# Patient Record
Sex: FEMALE | Race: WHITE | URBAN - METROPOLITAN AREA
[De-identification: names, ages, dates, MRNs, and addresses within clinical notes are randomized per-mention and may not be internally consistent; named-entity substitution may affect disease eponyms.]

---

## 2019-06-18 ENCOUNTER — Encounter (OUTPATIENT)
Dept: URBAN - METROPOLITAN AREA CLINIC 71 | Facility: CLINIC | Age: 69
End: 2019-06-18
Payer: MEDICARE

## 2019-06-18 PROCEDURE — 99204 OFFICE O/P NEW MOD 45 MIN: CPT | Performed by: OPHTHALMOLOGY

## 2019-07-30 ENCOUNTER — Encounter (OUTPATIENT)
Dept: URBAN - METROPOLITAN AREA CLINIC 71 | Facility: CLINIC | Age: 69
End: 2019-07-30
Payer: MEDICARE

## 2019-07-30 PROCEDURE — 92012 INTRM OPH EXAM EST PATIENT: CPT | Performed by: OPHTHALMOLOGY

## 2019-08-13 ENCOUNTER — SURGERY (OUTPATIENT)
Dept: URBAN - METROPOLITAN AREA SURGERY 44 | Facility: SURGERY | Age: 69
End: 2019-08-13
Payer: MEDICARE

## 2019-08-13 PROCEDURE — 66984 XCAPSL CTRC RMVL W/O ECP: CPT | Performed by: OPHTHALMOLOGY

## 2019-08-14 ENCOUNTER — Encounter (OUTPATIENT)
Dept: URBAN - METROPOLITAN AREA CLINIC 71 | Facility: CLINIC | Age: 69
End: 2019-08-14

## 2019-08-14 PROCEDURE — 99024 POSTOP FOLLOW-UP VISIT: CPT | Performed by: OPTOMETRIST

## 2019-08-27 ENCOUNTER — Encounter (OUTPATIENT)
Dept: URBAN - METROPOLITAN AREA CLINIC 71 | Facility: CLINIC | Age: 69
End: 2019-08-27

## 2019-08-27 PROCEDURE — 99024 POSTOP FOLLOW-UP VISIT: CPT | Performed by: OPHTHALMOLOGY

## 2019-09-17 ENCOUNTER — Encounter (OUTPATIENT)
Dept: URBAN - METROPOLITAN AREA CLINIC 71 | Facility: CLINIC | Age: 69
End: 2019-09-17

## 2019-09-17 PROCEDURE — 99024 POSTOP FOLLOW-UP VISIT: CPT | Performed by: OPHTHALMOLOGY

## 2019-10-17 ENCOUNTER — Encounter (OUTPATIENT)
Dept: URBAN - METROPOLITAN AREA CLINIC 71 | Facility: CLINIC | Age: 69
End: 2019-10-17

## 2019-10-17 PROCEDURE — 99024 POSTOP FOLLOW-UP VISIT: CPT | Performed by: OPHTHALMOLOGY

## 2020-04-21 ENCOUNTER — OFFICE VISIT (OUTPATIENT)
Dept: URBAN - METROPOLITAN AREA CLINIC 71 | Facility: CLINIC | Age: 70
End: 2020-04-21
Payer: MEDICARE

## 2020-04-21 DIAGNOSIS — H52.223 REGULAR ASTIGMATISM, BILATERAL: ICD-10-CM

## 2020-04-21 DIAGNOSIS — Z98.42 CATARACT EXTRACTION STATUS, LEFT EYE: Primary | ICD-10-CM

## 2020-04-21 PROCEDURE — 99212 OFFICE O/P EST SF 10 MIN: CPT | Performed by: OPHTHALMOLOGY

## 2020-04-21 PROCEDURE — 92134 CPTRZ OPH DX IMG PST SGM RTA: CPT | Performed by: OPHTHALMOLOGY

## 2020-04-21 RX ORDER — LORAZEPAM 0.5 MG/1
0.5 MG TABLET ORAL
Qty: 0 | Refills: 0 | Status: INACTIVE
Start: 2020-04-21 | End: 2020-07-21

## 2020-04-21 ASSESSMENT — INTRAOCULAR PRESSURE
OD: 16
OS: 18

## 2020-04-21 NOTE — IMPRESSION/PLAN
Impression: Cataract extraction status, left eye: Z98.42. Plan: pt is still seeing retina due to the thickening of the retina. 

will r/c 3 mons to see where she is at and how the retina is doing with an OCT

## 2020-07-21 ENCOUNTER — OFFICE VISIT (OUTPATIENT)
Dept: URBAN - METROPOLITAN AREA CLINIC 71 | Facility: CLINIC | Age: 70
End: 2020-07-21
Payer: MEDICARE

## 2020-07-21 DIAGNOSIS — H59.032 CYSTOID MACULAR EDEMA FOLLOWING CATARACT SURGERY, LEFT EYE: Primary | ICD-10-CM

## 2020-07-21 PROCEDURE — 92014 COMPRE OPH EXAM EST PT 1/>: CPT | Performed by: OPHTHALMOLOGY

## 2020-07-21 PROCEDURE — 92134 CPTRZ OPH DX IMG PST SGM RTA: CPT | Performed by: OPHTHALMOLOGY

## 2020-07-21 ASSESSMENT — KERATOMETRY
OD: 43.88
OS: 43.88

## 2020-07-21 ASSESSMENT — INTRAOCULAR PRESSURE
OS: 16
OS: 17
OD: 17

## 2020-07-21 NOTE — IMPRESSION/PLAN
Impression: Cystoid macular edema following cataract surgery, left eye: H59.032. Stable. Plan: OCT ordered today. Patient to continue appointments with retinal specialist.
Kanika Alexander to continue use of brimonidine.  
Will have patient return in 4 months

## 2020-11-17 ENCOUNTER — OFFICE VISIT (OUTPATIENT)
Dept: URBAN - METROPOLITAN AREA CLINIC 71 | Facility: CLINIC | Age: 70
End: 2020-11-17
Payer: MEDICARE

## 2020-11-17 PROCEDURE — 99213 OFFICE O/P EST LOW 20 MIN: CPT | Performed by: OPHTHALMOLOGY

## 2020-11-17 ASSESSMENT — INTRAOCULAR PRESSURE
OD: 20
OS: 31
OD: 17
OS: 32

## 2020-11-17 NOTE — IMPRESSION/PLAN
Impression: Cystoid macular edema following cataract surgery, left eye: H59.032.  Plan: Follow up with retina specialist as planned

## 2020-11-17 NOTE — IMPRESSION/PLAN
Impression: Ocular hypertension, bilateral: H40.053. Plan: Ocular Hypertension is elevated intraocular pressure that may cause glaucoma and require treatment. Not every patient with ocular hypertension needs treatment. Ocular Hypertension may need to be treated with glaucoma eye drops. Contact Office if: Ocular hypertension is associated with eye pain, blurry vision, halos around lights, and loss of peripheral vision. Recent studies have shown that patients with thin corneas, and a family history of glaucoma are more likely to develop glaucoma. 
Continue Brimonidine OS TID

## 2020-12-22 ENCOUNTER — OFFICE VISIT (OUTPATIENT)
Dept: URBAN - METROPOLITAN AREA CLINIC 71 | Facility: CLINIC | Age: 70
End: 2020-12-22
Payer: MEDICARE

## 2020-12-22 DIAGNOSIS — H40.053 OCULAR HYPERTENSION, BILATERAL: Primary | ICD-10-CM

## 2020-12-22 PROCEDURE — 99212 OFFICE O/P EST SF 10 MIN: CPT | Performed by: OPHTHALMOLOGY

## 2020-12-22 ASSESSMENT — INTRAOCULAR PRESSURE
OD: 17
OD: 20
OS: 23

## 2020-12-22 ASSESSMENT — KERATOMETRY
OD: 44.00
OS: 44.00

## 2020-12-22 NOTE — IMPRESSION/PLAN
Impression: Ocular hypertension, bilateral: H40.053. Plan: IOP improved. Patient to continue use of brimonidine and timolol TID OU. Patient to return in 3 months for an IOP check.

## 2020-12-22 NOTE — IMPRESSION/PLAN
Impression: Cystoid macular edema following cataract surgery, left eye: H59.032.  Plan: Keep appointments with retinal specialist.

## 2021-03-23 ENCOUNTER — OFFICE VISIT (OUTPATIENT)
Dept: URBAN - METROPOLITAN AREA CLINIC 71 | Facility: CLINIC | Age: 71
End: 2021-03-23
Payer: MEDICARE

## 2021-03-23 DIAGNOSIS — Z96.1 PRESENCE OF INTRAOCULAR LENS: ICD-10-CM

## 2021-03-23 PROCEDURE — 99212 OFFICE O/P EST SF 10 MIN: CPT | Performed by: OPHTHALMOLOGY

## 2021-03-23 ASSESSMENT — INTRAOCULAR PRESSURE
OS: 19
OD: 17
OD: 18

## 2021-03-23 NOTE — IMPRESSION/PLAN
Impression: Cystoid macular edema following cataract surgery, left eye: H59.032. Left. Plan: Keep appointments as scheduled with Dr. Juliano Cedeno.

## 2021-06-29 ENCOUNTER — OFFICE VISIT (OUTPATIENT)
Dept: URBAN - METROPOLITAN AREA CLINIC 71 | Facility: CLINIC | Age: 71
End: 2021-06-29
Payer: MEDICARE

## 2021-06-29 DIAGNOSIS — H26.492 OTHER SECONDARY CATARACT, LEFT EYE: ICD-10-CM

## 2021-06-29 PROCEDURE — 99212 OFFICE O/P EST SF 10 MIN: CPT | Performed by: OPHTHALMOLOGY

## 2021-06-29 PROCEDURE — 92083 EXTENDED VISUAL FIELD XM: CPT | Performed by: OPHTHALMOLOGY

## 2021-06-29 ASSESSMENT — INTRAOCULAR PRESSURE
OD: 12
OS: 16
OS: 15
OD: 13

## 2021-06-29 NOTE — IMPRESSION/PLAN
Impression: Ocular hypertension, left eye: H40.052. VF ordered today and reviewed. WNL OD. slight enlarged blind spot OS. IOP is stable. Patient is tolerating medication well. Plan: Continue use of latanoprost QHS OS and brimonidine BID OS. D/C timolol QD OS. Return in 6 weeks for OCT testing an IOP check off the timolol.

## 2021-06-29 NOTE — IMPRESSION/PLAN
Impression: Cystoid macular edema following cataract surgery, left eye: H59.032. Left. Plan: Keep appointments as scheduled with Dr. Polina Santoyo.

## 2021-08-10 ENCOUNTER — OFFICE VISIT (OUTPATIENT)
Dept: URBAN - METROPOLITAN AREA CLINIC 71 | Facility: CLINIC | Age: 71
End: 2021-08-10
Payer: MEDICARE

## 2021-08-10 PROCEDURE — 92133 CPTRZD OPH DX IMG PST SGM ON: CPT | Performed by: OPHTHALMOLOGY

## 2021-08-10 PROCEDURE — 99212 OFFICE O/P EST SF 10 MIN: CPT | Performed by: OPHTHALMOLOGY

## 2021-08-10 ASSESSMENT — INTRAOCULAR PRESSURE
OS: 15
OS: 16
OD: 12
OD: 11

## 2021-08-10 NOTE — IMPRESSION/PLAN
Impression: Cystoid macular edema following cataract surgery, left eye: H59.032. Left. Plan: Keep appointments as scheduled with Dr. Anatoliy Parrish.

## 2021-08-10 NOTE — IMPRESSION/PLAN
Impression: Ocular hypertension, left eye: H40.052. OCT ordered today and reviewed. RNFL appears stable. IOP is stable off timolol. Patient is tolerating medication well. Plan: Continue use of latanoprost QHS OS and brimonidine BID OS. Return in 3 months for an IOP check and at that moment we may consider a trial off brimonidine.

## 2021-11-16 ENCOUNTER — OFFICE VISIT (OUTPATIENT)
Dept: URBAN - METROPOLITAN AREA CLINIC 71 | Facility: CLINIC | Age: 71
End: 2021-11-16
Payer: MEDICARE

## 2021-11-16 DIAGNOSIS — H40.052 OCULAR HYPERTENSION, LEFT EYE: Primary | ICD-10-CM

## 2021-11-16 PROCEDURE — 99212 OFFICE O/P EST SF 10 MIN: CPT | Performed by: OPHTHALMOLOGY

## 2021-11-16 ASSESSMENT — INTRAOCULAR PRESSURE
OS: 17
OD: 20
OS: 14
OD: 17

## 2021-11-16 ASSESSMENT — KERATOMETRY
OD: 43.88
OS: 43.63

## 2021-11-16 NOTE — IMPRESSION/PLAN
Impression: Cystoid macular edema following cataract surgery, left eye: H59.032. Left. Plan: Keep appointments as scheduled with Dr. Namrata Joe.

## 2021-11-16 NOTE — IMPRESSION/PLAN
Impression: Ocular hypertension, left eye: H40.052. IOP is remaining stable. Patient is tolerating medication well. Patient is okay to continue with use of steroid as rx'd by Dr. Polina Santoyo. Plan: Continue use of timolol and brimondine OS. Patient states she does not use her latanoprost every night - okay to D/C use of latanoprost since the IOP is remaining stable without use of the drop. Return in 6 weeks for an IOP check.

## 2022-01-17 ENCOUNTER — OFFICE VISIT (OUTPATIENT)
Dept: URBAN - METROPOLITAN AREA CLINIC 71 | Facility: CLINIC | Age: 72
End: 2022-01-17
Payer: MEDICARE

## 2022-01-17 DIAGNOSIS — H40.042 STEROID RESPONDER, LEFT EYE: ICD-10-CM

## 2022-01-17 PROCEDURE — 99212 OFFICE O/P EST SF 10 MIN: CPT | Performed by: OPHTHALMOLOGY

## 2022-01-17 PROCEDURE — 92133 CPTRZD OPH DX IMG PST SGM ON: CPT | Performed by: OPHTHALMOLOGY

## 2022-01-17 ASSESSMENT — INTRAOCULAR PRESSURE
OD: 18
OS: 35
OS: 32

## 2022-01-17 NOTE — IMPRESSION/PLAN
Impression: Cystoid macular edema following cataract surgery, left eye: H59.032. Left. Plan: Keep appointments as scheduled with Dr. Antionette Jaimes.

## 2022-01-17 NOTE — IMPRESSION/PLAN
Impression: Ocular hypertension, left eye: H40.052. IOP is elevated. Discussed with patient the importance of using the drops as directed. OCT ordered today and reviewed. No changes for RNFL seen today. Discussed with patient that she can continue to fulvic drops for now. Plan: Continue use of timolol BID and brimondine BID OS. Return in 3 weeks for an IOP check with consistent use of drops.

## 2022-02-09 ENCOUNTER — OFFICE VISIT (OUTPATIENT)
Dept: URBAN - METROPOLITAN AREA CLINIC 71 | Facility: CLINIC | Age: 72
End: 2022-02-09
Payer: MEDICARE

## 2022-02-09 PROCEDURE — 99213 OFFICE O/P EST LOW 20 MIN: CPT | Performed by: OPHTHALMOLOGY

## 2022-02-09 ASSESSMENT — INTRAOCULAR PRESSURE
OS: 37
OD: 18
OD: 20
OS: 36

## 2022-02-09 NOTE — IMPRESSION/PLAN
Impression: Ocular hypertension, left eye: H40.052. IOP is remaining elevated. Reviewed OCT from 1/2022 and it shows cupping of about . 6 Plan: Continue use of timolol BID and brimondine BID OS. Start back on latanoprost QHS OS. Will refer patient to a glaucoma specialist for evaluation. Will have patient return to our care in 4-5 weeks to recheck. If patient is being followed by the glaucoma specialist, then she can move the appt out.

## 2022-02-09 NOTE — IMPRESSION/PLAN
Impression: Cystoid macular edema following cataract surgery, left eye: H59.032. Left. Plan: Keep appointments as scheduled with Dr. Claribel Oneil.

## 2022-02-18 ENCOUNTER — OFFICE VISIT (OUTPATIENT)
Dept: URBAN - METROPOLITAN AREA CLINIC 51 | Facility: CLINIC | Age: 72
End: 2022-02-18
Payer: MEDICARE

## 2022-02-18 DIAGNOSIS — H04.123 TEAR FILM INSUFFICIENCY OF BILATERAL LACRIMAL GLANDS: ICD-10-CM

## 2022-02-18 DIAGNOSIS — H40.1124 PRIMARY OPEN-ANGLE GLAUCOMA, LEFT EYE, INDETERMINATE STAGE: ICD-10-CM

## 2022-02-18 PROCEDURE — 92250 FUNDUS PHOTOGRAPHY W/I&R: CPT | Performed by: OPHTHALMOLOGY

## 2022-02-18 PROCEDURE — 76514 ECHO EXAM OF EYE THICKNESS: CPT | Performed by: OPHTHALMOLOGY

## 2022-02-18 PROCEDURE — 92083 EXTENDED VISUAL FIELD XM: CPT | Performed by: OPHTHALMOLOGY

## 2022-02-18 PROCEDURE — 99204 OFFICE O/P NEW MOD 45 MIN: CPT | Performed by: OPHTHALMOLOGY

## 2022-02-18 PROCEDURE — 92020 GONIOSCOPY: CPT | Performed by: OPHTHALMOLOGY

## 2022-02-18 RX ORDER — DORZOLAMIDE HYDROCHLORIDE AND TIMOLOL MALEATE 20; 5 MG/ML; MG/ML
SOLUTION/ DROPS OPHTHALMIC
Qty: 15 | Refills: 1 | Status: ACTIVE
Start: 2022-02-18

## 2022-02-18 ASSESSMENT — INTRAOCULAR PRESSURE
OS: 30
OD: 18

## 2022-02-18 NOTE — IMPRESSION/PLAN
Impression: Primary open-angle glaucoma, left eye, indeterminate stage: J16.4217.
+ Sulfa Allergy, + Hx of Migraines Denies Family Hx of Glaucoma, Heart or Lung Problems, Sleep Apnea 
-off Latanoprost (irritation) Plan: PLAN: On Timolol BID OS and Brimonidine BID OS, OFF Latanoprost QHS OS (irritation); Appropriate testing ordered upon review of referring notes ; VFT shows scatter ou, may be unrel os, appears to have cupping os>od;  and Photos done today will use to follow; likely steroid responder - hx of cme and steroid inj's x 2 ; and IOP is sub-optimal os, sulfa allergy is questionable, and would still rec try cosopt topical gtt,  change venkata to cosopt bid os, use brim tid os, and rtc in 1-2 week for iop eval and eval w Dr Juany Miranda for possible surgical options TESTS:  
2/18/22 Visual Field - OD: Fair-non-specific scatter; OS: Fair-sup scatter, inf depression 2/18/22 Pachymetry - OD: Thick and decreased risk; OS: Thick and decreased risk 2/18/22 Photo Disc - OD: Fair-cupping; OS Fair-cupping Discussed glaucoma diagnosis in detail with patient. Emphasized and explained compliance. Poor compliance can lead to blindness.  Educational materials provided

## 2022-02-18 NOTE — IMPRESSION/PLAN
Impression: Cystoid macular edema following cataract surgery, left eye: H59.032. Left.  Plan: Keep appointments as scheduled with Dr. Rudolph Razo.; Dr Michelle Capellan;  history of inflammation os -- steroid side effects discussed ( glaucoma, cataracts);  will add nsaid os for ketoralac 2-3x/day

## 2022-02-25 ENCOUNTER — OFFICE VISIT (OUTPATIENT)
Dept: URBAN - METROPOLITAN AREA CLINIC 24 | Facility: CLINIC | Age: 72
End: 2022-02-25
Payer: MEDICARE

## 2022-02-25 DIAGNOSIS — H40.42X2 GLAUCOMA SECONDARY TO EYE INFLAMMATION, LEFT EYE, MODERATE STAGE: Primary | ICD-10-CM

## 2022-02-25 PROCEDURE — 92020 GONIOSCOPY: CPT | Performed by: OPHTHALMOLOGY

## 2022-02-25 PROCEDURE — 92133 CPTRZD OPH DX IMG PST SGM ON: CPT | Performed by: OPHTHALMOLOGY

## 2022-02-25 PROCEDURE — 99214 OFFICE O/P EST MOD 30 MIN: CPT | Performed by: OPHTHALMOLOGY

## 2022-02-25 RX ORDER — PREDNISOLONE ACETATE 10 MG/ML
1 % SUSPENSION/ DROPS OPHTHALMIC
Qty: 5 | Refills: 1 | Status: INACTIVE
Start: 2022-02-25 | End: 2022-02-25

## 2022-02-25 ASSESSMENT — INTRAOCULAR PRESSURE
OS: 37
OD: 15

## 2022-02-25 NOTE — IMPRESSION/PLAN
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 + Sulfa Allergy, + Hx of Migraines +Brimonidine allergy  + NO PGA 2/2 Iritis Plan: Pt has Glaucoma    Gonio : CBB 1+ /CBB 3+     Pachs:  556/521 Today's IOP : 15/37        Tmax  : 20/37 Target IOP **** Pt denies Fhx of Glaucoma Right eye is the better seeing eye HVF Central loss not classic for glaucoma 2/18/22 C/D:  
OCT: 69/86 2/25/22 Pt denies Lung /Heart dx Plan :
1. Continue Cos bid os
ketor TID os 
STOP brimonidine 2/2 typical allergy Discussed details about Glaucoma and that without proper control of pressures irreversible blindness can occur. Patient understands risks. Emphasize compliance with drop and without compliance vision loss progression can occur. 2. Discussed need for surgical intervention. Gave patient options of Canaloplasty (minimally invasive)  vs Tube shunt surgery. Explained to patient pros and cons of each procedure. Explained to patient Canaloplasty may not be enough any additional treatment may be needed. 
3. RTC 1 week IOP ([place])

## 2022-02-25 NOTE — IMPRESSION/PLAN
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 Plan: Discussed diagnosis with patient.  Explained steroid use is necessary for inflammation Start Pred QID OS

## 2022-02-25 NOTE — IMPRESSION/PLAN
Impression: Presence of intraocular lens: Z96.1. Plan: Apparent complex cataract surgery OS, Tear in bag from 1-3. No issues with lens positioning.

## 2022-03-04 ENCOUNTER — OFFICE VISIT (OUTPATIENT)
Dept: URBAN - METROPOLITAN AREA CLINIC 10 | Facility: CLINIC | Age: 72
End: 2022-03-04
Payer: MEDICARE

## 2022-03-04 DIAGNOSIS — H20.9 IRIDOCYCLITIS: ICD-10-CM

## 2022-03-04 PROCEDURE — 99213 OFFICE O/P EST LOW 20 MIN: CPT | Performed by: OPHTHALMOLOGY

## 2022-03-04 ASSESSMENT — INTRAOCULAR PRESSURE
OS: 23
OD: 15

## 2022-03-04 NOTE — IMPRESSION/PLAN
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 Plan: Discussed diagnosis with patient. Explained steroid use is necessary for inflammation Cont Pred QID OS To have steroid injection next Thursday

## 2022-03-04 NOTE — IMPRESSION/PLAN
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 + Sulfa Allergy, + Hx of Migraines +Brimonidine allergy  + NO PGA 2/2 Iritis Plan: Pt has Glaucoma    Gonio : CBB 1+ /CBB 3+     Pachs:  556/521 Today's IOP : 15/23        Tmax  : 20/37 Target IOP low to mid teens OU Pt denies Fhx of Glaucoma Right eye is the better seeing eye HVF Central loss not classic for glaucoma 2/18/22 C/D: .4X.4 // .9X.9  
OCT: 69/86 2/25/22 Pt denies Lung /Heart dx Plan :
1. Continue Cos bid os
ketor TID-QID os Pred QID OS Discussed details about Glaucoma and that without proper control of pressures irreversible blindness can occur. Patient understands risks. Emphasize compliance with drop and without compliance vision loss progression can occur. 2. Discussed need for surgical intervention. Gave patient options of Canaloplasty (minimally invasive)  vs Tube shunt surgery. Explained to patient pros and cons of each procedure. Explained to patient Canaloplasty may not be enough any additional treatment may be needed. 3. Pt planning to have steroid injection on Thursday with outside retina - IOP improved with addition of steroid and d/c of brimonidine 4.  Pt to have IOP and good A/C check with Dr. Stanley San - if IOP still in the 20's plan for OMNI/Goniotomy OS (already scheduled in 4 weeks) and if in mid to low teens then cancel surgery and watch IOP closely

## 2022-03-09 ENCOUNTER — OFFICE VISIT (OUTPATIENT)
Dept: URBAN - METROPOLITAN AREA CLINIC 71 | Facility: CLINIC | Age: 72
End: 2022-03-09
Payer: MEDICARE

## 2022-03-09 PROCEDURE — 99212 OFFICE O/P EST SF 10 MIN: CPT | Performed by: OPHTHALMOLOGY

## 2022-03-09 ASSESSMENT — INTRAOCULAR PRESSURE
OS: 15
OD: 15
OD: 14
OS: 13

## 2022-03-09 NOTE — IMPRESSION/PLAN
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 sulfa and brimonidine allergy. IOP is stable and within the target range. Patient will likely cancel her glaucoma procedure due to the IO being stable. Plan: Patient to continue use of dorzol-timolol BID OS. Continue use of pred and ketorolac. Return to our care in 3 weeks for an IOP check. Call if any changes in vision occur.

## 2022-03-30 ENCOUNTER — OFFICE VISIT (OUTPATIENT)
Dept: URBAN - METROPOLITAN AREA CLINIC 71 | Facility: CLINIC | Age: 72
End: 2022-03-30
Payer: MEDICARE

## 2022-03-30 DIAGNOSIS — H25.11 AGE-RELATED NUCLEAR CATARACT, RIGHT EYE: ICD-10-CM

## 2022-03-30 PROCEDURE — 99212 OFFICE O/P EST SF 10 MIN: CPT | Performed by: OPHTHALMOLOGY

## 2022-03-30 ASSESSMENT — INTRAOCULAR PRESSURE
OS: 13
OD: 13
OS: 22
OD: 16

## 2022-03-30 NOTE — IMPRESSION/PLAN
Impression: Age-related nuclear cataract, right eye: H25.11. Stable. Plan: No treatment currently recommended. The patient will monitor vision changes and contact us with any decrease in vision.

## 2022-03-30 NOTE — IMPRESSION/PLAN
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 sulfa and brimonidine allergy. IOP is elevated again in the left eye. Discussed with patient. Plan: Dr. Juliano Cedeno told her to D/C use of pred and ketorolac -okay to stay off. Patient to continue use of dorzol-timolol BID OS. Recommend patient to keep the surgery as scheduled with Dr. Sharyle Nones. Will have patient return in 4-5 weeks for an IOP check, but if Dr. Sharyle Nones wants us to see her later or sooner, then the appointment can be changed. Call if any changes in vision occur.

## 2022-05-03 ENCOUNTER — OFFICE VISIT (OUTPATIENT)
Dept: URBAN - METROPOLITAN AREA CLINIC 71 | Facility: CLINIC | Age: 72
End: 2022-05-03
Payer: MEDICARE

## 2022-05-03 DIAGNOSIS — H25.11 AGE-RELATED NUCLEAR CATARACT, RIGHT EYE: ICD-10-CM

## 2022-05-03 DIAGNOSIS — H40.42X2 GLAUCOMA SECONDARY TO EYE INFLAMMATION, LEFT EYE, MODERATE STAGE: Primary | ICD-10-CM

## 2022-05-03 DIAGNOSIS — Z96.1 PRESENCE OF INTRAOCULAR LENS: ICD-10-CM

## 2022-05-03 PROCEDURE — 99212 OFFICE O/P EST SF 10 MIN: CPT | Performed by: OPHTHALMOLOGY

## 2022-05-03 ASSESSMENT — INTRAOCULAR PRESSURE
OD: 19
OS: 24
OD: 18

## 2022-05-03 NOTE — IMPRESSION/PLAN
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 sulfa and brimonidine allergy. IOP is remaining elevated. Plan: Patient to continue use of dorzol-timolol BID OS. Keep appointment as scheduled with Dr. Yousuf Lisa for surgery. Will wait for Dr. Yousuf Lisa to refer back to our care. Call if any changes occur.

## 2022-05-09 ENCOUNTER — SURGERY (OUTPATIENT)
Dept: URBAN - METROPOLITAN AREA SURGERY 5 | Facility: SURGERY | Age: 72
End: 2022-05-09
Payer: MEDICARE

## 2022-05-09 PROCEDURE — 66174 TRLUML DIL AQ O/F CAN W/O ST: CPT | Performed by: OPHTHALMOLOGY

## 2022-05-09 RX ORDER — OFLOXACIN 3 MG/ML
0.3 % SOLUTION/ DROPS OPHTHALMIC
Qty: 5 | Refills: 1 | Status: ACTIVE
Start: 2022-05-09

## 2022-05-09 RX ORDER — PREDNISOLONE ACETATE 10 MG/ML
1 % SUSPENSION/ DROPS OPHTHALMIC
Qty: 5 | Refills: 1 | Status: ACTIVE
Start: 2022-05-09

## 2022-05-10 ENCOUNTER — POST-OPERATIVE VISIT (OUTPATIENT)
Dept: URBAN - METROPOLITAN AREA CLINIC 10 | Facility: CLINIC | Age: 72
End: 2022-05-10
Payer: MEDICARE

## 2022-05-10 DIAGNOSIS — Z48.810 ENCOUNTER FOR SURGICAL AFTERCARE FOLLOWING SURGERY ON A SENSE ORGAN: Primary | ICD-10-CM

## 2022-05-10 PROCEDURE — 99024 POSTOP FOLLOW-UP VISIT: CPT | Performed by: STUDENT IN AN ORGANIZED HEALTH CARE EDUCATION/TRAINING PROGRAM

## 2022-05-10 ASSESSMENT — INTRAOCULAR PRESSURE: OS: 13

## 2022-05-17 ENCOUNTER — POST-OPERATIVE VISIT (OUTPATIENT)
Dept: URBAN - METROPOLITAN AREA CLINIC 71 | Facility: CLINIC | Age: 72
End: 2022-05-17
Payer: MEDICARE

## 2022-05-17 PROCEDURE — 99024 POSTOP FOLLOW-UP VISIT: CPT | Performed by: OPHTHALMOLOGY

## 2022-05-17 RX ORDER — DORZOLAMIDE HYDROCHLORIDE AND TIMOLOL MALEATE 20; 5 MG/ML; MG/ML
SOLUTION/ DROPS OPHTHALMIC
Qty: 15 | Refills: 1 | Status: ACTIVE
Start: 2022-05-17

## 2022-05-17 ASSESSMENT — INTRAOCULAR PRESSURE
OD: 18
OS: 22
OS: 19

## 2022-05-17 NOTE — IMPRESSION/PLAN
Impression: S/P Glaucoma surgery with Canaloplasty; with Goniotomy OS - 8 Days. Healing well. IOP is stable, but the pred may be causing the IOP to stay in the 20's. Plan: Continue use of dorzolamide-timolol BID OS. Stay on pred acetate as directed by Dr. Juany Miranda. Return next week to Dr. Juany Miranda.

## 2022-06-01 ENCOUNTER — POST-OPERATIVE VISIT (OUTPATIENT)
Dept: URBAN - METROPOLITAN AREA CLINIC 71 | Facility: CLINIC | Age: 72
End: 2022-06-01
Payer: MEDICARE

## 2022-06-01 DIAGNOSIS — Z48.810 ENCOUNTER FOR SURGICAL AFTERCARE FOLLOWING SURGERY ON A SENSE ORGAN: Primary | ICD-10-CM

## 2022-06-01 PROCEDURE — 99024 POSTOP FOLLOW-UP VISIT: CPT | Performed by: OPHTHALMOLOGY

## 2022-06-01 ASSESSMENT — INTRAOCULAR PRESSURE
OD: 17
OS: 21
OD: 19
OS: 22

## 2022-06-01 NOTE — IMPRESSION/PLAN
Impression: S/P Glaucoma surgery with Canaloplasty; with Goniotomy OS - 23 Days. Patient is a steroid responder, but okay to stay on the taper regimen as directed by Dr. Mary Kay Garcia. IOP is remaining stable compared to the previous results, but still too elevated for her nerve. Patient's last day of using pred will be Sunday and ideally the IOP will lower. Plan: Will try to refer patient back to Dr. Mary Kay Garcia today for a follow up. Patient to return to our care in 1 month for a recheck. Continue use of dorzol-timolol BID OS and pred acetate QAM OS until Sunday.

## 2022-06-28 ENCOUNTER — POST-OPERATIVE VISIT (OUTPATIENT)
Dept: URBAN - METROPOLITAN AREA CLINIC 71 | Facility: CLINIC | Age: 72
End: 2022-06-28
Payer: MEDICARE

## 2022-06-28 DIAGNOSIS — Z48.810 ENCOUNTER FOR SURGICAL AFTERCARE FOLLOWING SURGERY ON A SENSE ORGAN: Primary | ICD-10-CM

## 2022-06-28 PROCEDURE — 99024 POSTOP FOLLOW-UP VISIT: CPT | Performed by: OPHTHALMOLOGY

## 2022-06-28 ASSESSMENT — INTRAOCULAR PRESSURE
OS: 21
OS: 13
OD: 15
OD: 17

## 2022-06-28 NOTE — IMPRESSION/PLAN
Impression: S/P Glaucoma surgery with Canaloplasty; with Goniotomy OS - 50 Days. IOP is remaining elevated in the left eye. Plan: Keep appts as scheduled with Dr. Marquise Elizabeth - patient states she has an appt with Dr. Marquise Elizabeth on July 13th. Condition use of dorzolamide-timolol BID OS. Return to our care in 4-6 weeks for VF testing and an IOP check.

## 2022-07-13 ENCOUNTER — POST-OPERATIVE VISIT (OUTPATIENT)
Dept: URBAN - METROPOLITAN AREA CLINIC 24 | Facility: CLINIC | Age: 72
End: 2022-07-13
Payer: MEDICARE

## 2022-07-13 DIAGNOSIS — H40.42X2 GLAUCOMA SECONDARY TO EYE INFLAMMATION, LEFT EYE, MODERATE STAGE: Primary | ICD-10-CM

## 2022-07-13 PROCEDURE — 99024 POSTOP FOLLOW-UP VISIT: CPT | Performed by: OPHTHALMOLOGY

## 2022-07-13 ASSESSMENT — INTRAOCULAR PRESSURE
OS: 20
OD: 19

## 2022-07-13 NOTE — IMPRESSION/PLAN
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 + Sulfa Allergy, + Hx of Migraines +Brimonidine allergy  + NO PGA 2/2 Iritis S/P canaloplasty with Goniotomy OS. Plan: Pt has Glaucoma    Gonio : CBB 1+ /CBB 3+     Pachs:  556/521 Today's IOP : 19/20    Tmax  : 20/37 Target IOP low to mid teens OU Pt denies Fhx of Glaucoma Right eye is the better seeing eye HVF Central loss not classic for glaucoma 2/18/22 C/D: .4X.4 // .9X.9  
OCT: 69/86 2/25/22 Pt denies Lung /Heart dx Plan :
1. Continue (Maxed out topically) Cos bid os 2. Recent Injection OS. IOP higher today however testing shows minimal loss. Next step would be a tube shunt surgery. Too aggressive for level of damage at this time. No additional treatment at this time, Will continue to monitor. Gave the option for a micropulse should there be progression on testing. Will repeat RNFL today and again in 3 months.

## 2022-08-02 ENCOUNTER — OFFICE VISIT (OUTPATIENT)
Dept: URBAN - METROPOLITAN AREA CLINIC 71 | Facility: CLINIC | Age: 72
End: 2022-08-02
Payer: MEDICARE

## 2022-08-02 DIAGNOSIS — H25.11 AGE-RELATED NUCLEAR CATARACT, RIGHT EYE: ICD-10-CM

## 2022-08-02 DIAGNOSIS — H40.42X2 GLAUCOMA SECONDARY TO EYE INFLAMMATION, LEFT EYE, MODERATE STAGE: Primary | ICD-10-CM

## 2022-08-02 DIAGNOSIS — Z96.1 PRESENCE OF INTRAOCULAR LENS: ICD-10-CM

## 2022-08-02 PROCEDURE — 99212 OFFICE O/P EST SF 10 MIN: CPT | Performed by: OPHTHALMOLOGY

## 2022-08-02 PROCEDURE — 92083 EXTENDED VISUAL FIELD XM: CPT | Performed by: OPHTHALMOLOGY

## 2022-08-02 ASSESSMENT — INTRAOCULAR PRESSURE
OD: 18
OS: 21
OD: 19

## 2022-08-02 NOTE — IMPRESSION/PLAN
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 S/P canaloplasty with Goniotomy OS. VF ordered today and reviewed. VF is WNL OD, early arcuate OS. IOP is stable. Patient is tolerating medication well. Plan: Patient to continue use of cosopt BID OU. Continue appointments with Dr. Sharyle Nones. Return to our care in 4 months for an IOP check.

## 2022-10-13 ENCOUNTER — OFFICE VISIT (OUTPATIENT)
Dept: URBAN - METROPOLITAN AREA CLINIC 24 | Facility: CLINIC | Age: 72
End: 2022-10-13
Payer: MEDICARE

## 2022-10-13 DIAGNOSIS — H40.42X2 GLAUCOMA SECONDARY TO EYE INFLAMMATION, LEFT EYE, MODERATE STAGE: Primary | ICD-10-CM

## 2022-10-13 PROCEDURE — 99213 OFFICE O/P EST LOW 20 MIN: CPT | Performed by: OPHTHALMOLOGY

## 2022-10-13 RX ORDER — PREDNISOLONE ACETATE 10 MG/ML
1 % SUSPENSION/ DROPS OPHTHALMIC
Qty: 5 | Refills: 1 | Status: ACTIVE
Start: 2022-10-13

## 2022-10-13 ASSESSMENT — INTRAOCULAR PRESSURE
OS: 23
OD: 18

## 2022-10-13 NOTE — IMPRESSION/PLAN
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 + Sulfa Allergy, + Hx of Migraines +Brimonidine allergy  + NO PGA 2/2 Iritis S/P canaloplasty with Goniotomy OS. Plan: Pt has Glaucoma    Gonio : CBB 1+ /CBB 3+     Pachs:  556/521 Today's IOP : 18/23    Tmax  : 20/37 Target IOP low to mid teens OU Pt denies Fhx of Glaucoma Right eye is the better seeing eye HVF Central loss not classic for glaucoma 2/18/22 C/D: .4X.4 // .9X.9  
OCT: 69/86 2/25/22 Pt denies Lung /Heart dx Plan :
1. Continue (Maxed out topically) Cos bid os 2. Recent Injection OS. IOP higher today however testing shows minimal loss. Next step would be a tube shunt surgery. Too aggressive for level of damage at this time. 3. Discussed with the patient the findings of today's exam. IOP is too high for the level of glaucomatous damage at the present time. I recommend patient have a Delaney Anju. Risks, benefits, alternatives to laser discussed with patient, including pain, bleeding, loss of vision, loss of eye, need for further surgery, double vision, retinal problems, infection, and inflammation. Although surgery is expected to improve the condition, the patient understands that there is a chance the condition could worsen in the future. The procedure is being done in an attempt to preserve the current level of vision. Vision improvement is NOT expected. Explained to patient the procedure has a 50% success rate and additional procedures may be necessary. Patient advised that ongoing uncontrolled glaucoma may result in permanent vision loss. All questions answered.  Patient wishes to proceed with laser.
-See Kenyatta Mendes to schedule Micropulse MP3 laser in the OS Eye, H&P, Pred QID

## 2022-12-02 ENCOUNTER — OFFICE VISIT (OUTPATIENT)
Dept: URBAN - METROPOLITAN AREA CLINIC 71 | Facility: CLINIC | Age: 72
End: 2022-12-02
Payer: MEDICARE

## 2022-12-02 DIAGNOSIS — H25.11 AGE-RELATED NUCLEAR CATARACT, RIGHT EYE: ICD-10-CM

## 2022-12-02 DIAGNOSIS — H40.043 STEROID RESPONDER, BILATERAL: ICD-10-CM

## 2022-12-02 DIAGNOSIS — H40.42X2 GLAUCOMA SECONDARY TO EYE INFLAMMATION, LEFT EYE, MODERATE STAGE: Primary | ICD-10-CM

## 2022-12-02 DIAGNOSIS — Z96.1 PRESENCE OF INTRAOCULAR LENS: ICD-10-CM

## 2022-12-02 PROCEDURE — 99213 OFFICE O/P EST LOW 20 MIN: CPT | Performed by: OPHTHALMOLOGY

## 2022-12-02 ASSESSMENT — INTRAOCULAR PRESSURE
OS: 14
OD: 16
OS: 19
OD: 13

## 2022-12-02 NOTE — IMPRESSION/PLAN
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 S/P canaloplasty with Goniotomy OS. Although IOP is lower today in the left eye, it is still recommended to get the IOP lower. Agree with Dr. Carrie Mera to proceed with the laser procedure. Plan: Patient to continue use of cosopt BID OU. Return as scheduled with Dr. Carrie Mera, then to our care in 3 months for an IOP check.

## 2022-12-16 ENCOUNTER — POST-OPERATIVE VISIT (OUTPATIENT)
Dept: URBAN - METROPOLITAN AREA CLINIC 24 | Facility: CLINIC | Age: 72
End: 2022-12-16
Payer: MEDICARE

## 2022-12-16 DIAGNOSIS — Z48.810 ENCOUNTER FOR SURGICAL AFTERCARE FOLLOWING SURGERY ON A SENSE ORGAN: Primary | ICD-10-CM

## 2022-12-16 PROCEDURE — 99024 POSTOP FOLLOW-UP VISIT: CPT | Performed by: STUDENT IN AN ORGANIZED HEALTH CARE EDUCATION/TRAINING PROGRAM

## 2022-12-16 ASSESSMENT — INTRAOCULAR PRESSURE: OS: 11

## 2022-12-16 NOTE — IMPRESSION/PLAN
Impression: S/P Diode micropulse Laser OS - 1 Day. Encounter for surgical aftercare following surgery on a sense organ  Z48.810. Plan: IOP good today but given h/o steroid response cont DZT Cont pred & ofloxacin as directed

## 2022-12-23 ENCOUNTER — POST-OPERATIVE VISIT (OUTPATIENT)
Dept: URBAN - METROPOLITAN AREA CLINIC 24 | Facility: CLINIC | Age: 72
End: 2022-12-23
Payer: MEDICARE

## 2022-12-23 DIAGNOSIS — Z48.810 ENCOUNTER FOR SURGICAL AFTERCARE FOLLOWING SURGERY ON THE SENSE ORGANS: Primary | ICD-10-CM

## 2022-12-23 PROCEDURE — 99024 POSTOP FOLLOW-UP VISIT: CPT | Performed by: OPTOMETRIST

## 2022-12-23 ASSESSMENT — INTRAOCULAR PRESSURE
OD: 15
OS: 20
OS: 23

## 2022-12-23 NOTE — IMPRESSION/PLAN
Impression: S/P Diode micropulse Laser OS - 8 Days. Encounter for surgical aftercare following surgery on a sense organ  Z48.810.  Excellent post op course   Condition is improving - Plan: continue pred 1% OS with taper as directed
use cosopt bid os

## 2023-01-25 ENCOUNTER — POST-OPERATIVE VISIT (OUTPATIENT)
Dept: URBAN - METROPOLITAN AREA CLINIC 71 | Facility: CLINIC | Age: 73
End: 2023-01-25
Payer: MEDICARE

## 2023-01-25 DIAGNOSIS — Z48.810 ENCOUNTER FOR SURGICAL AFTERCARE FOLLOWING SURGERY ON A SENSE ORGAN: Primary | ICD-10-CM

## 2023-01-25 PROCEDURE — 99024 POSTOP FOLLOW-UP VISIT: CPT | Performed by: OPHTHALMOLOGY

## 2023-01-25 ASSESSMENT — INTRAOCULAR PRESSURE
OD: 16
OS: 10
OD: 11
OS: 15

## 2023-01-25 NOTE — IMPRESSION/PLAN
Impression: S/P Diode micropulse Laser OS - 41 Days. IOP is within a normal range with current medication. Plan: Continue use of cosopt BID OS. Patient is in the process of rescheduling to see Dr. Sharyle Nones. Keep appointments as scheduled with Dr. Juliano Cedeno. Return to our care in 6-8 weeks for an IOP check.

## 2023-03-08 ENCOUNTER — POST-OPERATIVE VISIT (OUTPATIENT)
Dept: URBAN - METROPOLITAN AREA CLINIC 71 | Facility: CLINIC | Age: 73
End: 2023-03-08
Payer: MEDICARE

## 2023-03-08 DIAGNOSIS — Z48.810 ENCOUNTER FOR SURGICAL AFTERCARE FOLLOWING SURGERY ON A SENSE ORGAN: Primary | ICD-10-CM

## 2023-03-08 PROCEDURE — 99024 POSTOP FOLLOW-UP VISIT: CPT | Performed by: OPHTHALMOLOGY

## 2023-03-08 ASSESSMENT — INTRAOCULAR PRESSURE
OD: 16
OS: 14
OS: 15
OD: 13

## 2023-03-08 NOTE — IMPRESSION/PLAN
Impression: S/P Diode micropulse Laser OS - 83 Days. IOP is staying within a normal range on the current drops, but patient is noticing irritation with the current medication. Discussed the option of doing a trial off the drops to see if the IOP stays WNL. Plan: D/C use of dorzolamide-timolol BID OS. Return in 2-3 weeks for an IOP check to see if the IOP elevates.

## 2023-03-22 ENCOUNTER — OFFICE VISIT (OUTPATIENT)
Dept: URBAN - METROPOLITAN AREA CLINIC 71 | Facility: CLINIC | Age: 73
End: 2023-03-22
Payer: MEDICARE

## 2023-03-22 DIAGNOSIS — H40.42X2 GLAUCOMA SECONDARY TO EYE INFLAMMATION, LEFT EYE, MODERATE STAGE: Primary | ICD-10-CM

## 2023-03-22 PROCEDURE — 99212 OFFICE O/P EST SF 10 MIN: CPT | Performed by: OPHTHALMOLOGY

## 2023-03-22 ASSESSMENT — INTRAOCULAR PRESSURE
OS: 18
OS: 16
OD: 15

## 2023-03-22 NOTE — IMPRESSION/PLAN
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 S/P Diode micropulse Laser OS  
IOP is staying within a normal range off the drops. Plan: PT ok to continue off the GTTS. Will monitor IOP in 6 weeks.

## 2023-05-03 ENCOUNTER — OFFICE VISIT (OUTPATIENT)
Dept: URBAN - METROPOLITAN AREA CLINIC 71 | Facility: CLINIC | Age: 73
End: 2023-05-03
Payer: MEDICARE

## 2023-05-03 DIAGNOSIS — H40.42X2 GLAUCOMA SECONDARY TO EYE INFLAMMATION, LEFT EYE, MODERATE STAGE: Primary | ICD-10-CM

## 2023-05-03 PROCEDURE — 99212 OFFICE O/P EST SF 10 MIN: CPT | Performed by: OPHTHALMOLOGY

## 2023-05-03 PROCEDURE — 92133 CPTRZD OPH DX IMG PST SGM ON: CPT | Performed by: OPHTHALMOLOGY

## 2023-05-03 RX ORDER — DORZOLAMIDE HCL 20 MG/ML
2 % SOLUTION/ DROPS OPHTHALMIC
Qty: 5 | Refills: 6 | Status: ACTIVE
Start: 2023-05-03

## 2023-05-03 ASSESSMENT — INTRAOCULAR PRESSURE
OS: 22
OS: 20
OD: 17

## 2023-05-03 NOTE — IMPRESSION/PLAN
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 S/P Diode micropulse Laser OS  
OCT ordered and reviewed today. RNFL is remaining stable OU. IOP is elevating again in OS off the D/T. Would like her around 15. Plan: Since PT reports the D/T bothered her eye recommend starting Dorzolamide BID OS to see how she does. Will R/C IOP again in 4 weeks.

## 2023-06-05 ENCOUNTER — OFFICE VISIT (OUTPATIENT)
Dept: URBAN - METROPOLITAN AREA CLINIC 71 | Facility: CLINIC | Age: 73
End: 2023-06-05
Payer: MEDICARE

## 2023-06-05 DIAGNOSIS — H40.42X2 GLAUCOMA SECONDARY TO EYE INFLAMMATION, LEFT EYE, MODERATE STAGE: Primary | ICD-10-CM

## 2023-06-05 DIAGNOSIS — Z96.1 PRESENCE OF INTRAOCULAR LENS: ICD-10-CM

## 2023-06-05 PROCEDURE — 99213 OFFICE O/P EST LOW 20 MIN: CPT | Performed by: OPHTHALMOLOGY

## 2023-06-05 ASSESSMENT — INTRAOCULAR PRESSURE
OS: 15
OD: 18
OS: 17
OD: 13

## 2023-06-05 NOTE — IMPRESSION/PLAN
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 S/P Diode micropulse Laser OS  
IOP is stable and back to target. Target IOP is 15 Plan: Cotninue the Dorzolamide BID OS.  Will continue to follow in 4 months for a VF and IOP check

## 2023-10-09 ENCOUNTER — OFFICE VISIT (OUTPATIENT)
Dept: URBAN - METROPOLITAN AREA CLINIC 71 | Facility: CLINIC | Age: 73
End: 2023-10-09
Payer: MEDICARE

## 2023-10-09 DIAGNOSIS — H40.42X2 GLAUCOMA SECONDARY TO EYE INFLAMMATION, LEFT EYE, MODERATE STAGE: Primary | ICD-10-CM

## 2023-10-09 DIAGNOSIS — Z96.1 PRESENCE OF INTRAOCULAR LENS: ICD-10-CM

## 2023-10-09 PROCEDURE — 92083 EXTENDED VISUAL FIELD XM: CPT | Performed by: OPHTHALMOLOGY

## 2023-10-09 PROCEDURE — 99213 OFFICE O/P EST LOW 20 MIN: CPT | Performed by: OPHTHALMOLOGY

## 2023-10-09 RX ORDER — LATANOPROST 50 UG/ML
0.005 % SOLUTION OPHTHALMIC
Qty: 7.5 | Refills: 3 | Status: ACTIVE
Start: 2023-10-09

## 2023-10-09 ASSESSMENT — INTRAOCULAR PRESSURE
OD: 19
OD: 18
OS: 23

## 2023-11-08 ENCOUNTER — OFFICE VISIT (OUTPATIENT)
Dept: URBAN - METROPOLITAN AREA CLINIC 71 | Facility: CLINIC | Age: 73
End: 2023-11-08
Payer: MEDICARE

## 2023-11-08 DIAGNOSIS — Z96.1 PRESENCE OF INTRAOCULAR LENS: ICD-10-CM

## 2023-11-08 DIAGNOSIS — H26.492 OTHER SECONDARY CATARACT, LEFT EYE: ICD-10-CM

## 2023-11-08 DIAGNOSIS — H40.42X2 GLAUCOMA SECONDARY TO EYE INFLAMMATION, LEFT EYE, MODERATE STAGE: Primary | ICD-10-CM

## 2023-11-08 PROCEDURE — 99212 OFFICE O/P EST SF 10 MIN: CPT | Performed by: OPHTHALMOLOGY

## 2023-11-08 ASSESSMENT — INTRAOCULAR PRESSURE
OS: 15
OD: 18
OS: 14

## 2024-01-30 ENCOUNTER — OFFICE VISIT (OUTPATIENT)
Dept: URBAN - METROPOLITAN AREA CLINIC 71 | Facility: CLINIC | Age: 74
End: 2024-01-30
Payer: MEDICARE

## 2024-01-30 DIAGNOSIS — H40.42X2 GLAUCOMA SECONDARY TO EYE INFLAMMATION, LEFT EYE, MODERATE STAGE: Primary | ICD-10-CM

## 2024-01-30 PROCEDURE — 99213 OFFICE O/P EST LOW 20 MIN: CPT | Performed by: OPHTHALMOLOGY

## 2024-01-30 ASSESSMENT — INTRAOCULAR PRESSURE
OS: 13
OD: 17
OS: 14
OD: 18

## 2024-05-06 ENCOUNTER — OFFICE VISIT (OUTPATIENT)
Dept: URBAN - METROPOLITAN AREA CLINIC 71 | Facility: CLINIC | Age: 74
End: 2024-05-06
Payer: MEDICARE

## 2024-05-06 DIAGNOSIS — H26.492 OTHER SECONDARY CATARACT, LEFT EYE: ICD-10-CM

## 2024-05-06 DIAGNOSIS — H40.42X2 GLAUCOMA SECONDARY TO EYE INFLAMMATION, LEFT EYE, MODERATE STAGE: Primary | ICD-10-CM

## 2024-05-06 DIAGNOSIS — Z96.1 PRESENCE OF INTRAOCULAR LENS: ICD-10-CM

## 2024-05-06 PROCEDURE — 99213 OFFICE O/P EST LOW 20 MIN: CPT | Performed by: OPHTHALMOLOGY

## 2024-05-06 ASSESSMENT — VISUAL ACUITY: OS: 20/200

## 2024-05-06 ASSESSMENT — INTRAOCULAR PRESSURE
OD: 17
OS: 15
OS: 16
OD: 21

## 2024-08-01 ENCOUNTER — OFFICE VISIT (OUTPATIENT)
Dept: URBAN - METROPOLITAN AREA CLINIC 71 | Facility: CLINIC | Age: 74
End: 2024-08-01
Payer: MEDICARE

## 2024-08-01 DIAGNOSIS — H40.42X2 GLAUCOMA SECONDARY TO EYE INFLAMMATION, LEFT EYE, MODERATE STAGE: Primary | ICD-10-CM

## 2024-08-01 DIAGNOSIS — H26.492 OTHER SECONDARY CATARACT, LEFT EYE: ICD-10-CM

## 2024-08-01 DIAGNOSIS — Z96.1 PRESENCE OF INTRAOCULAR LENS: ICD-10-CM

## 2024-08-01 PROCEDURE — 99213 OFFICE O/P EST LOW 20 MIN: CPT | Performed by: OPHTHALMOLOGY

## 2024-08-01 ASSESSMENT — INTRAOCULAR PRESSURE
OS: 14
OS: 15
OD: 16

## 2024-12-10 ENCOUNTER — OFFICE VISIT (OUTPATIENT)
Dept: URBAN - METROPOLITAN AREA CLINIC 71 | Facility: CLINIC | Age: 74
End: 2024-12-10
Payer: MEDICARE

## 2024-12-10 DIAGNOSIS — Z96.1 PRESENCE OF INTRAOCULAR LENS: ICD-10-CM

## 2024-12-10 DIAGNOSIS — H25.11 AGE-RELATED NUCLEAR CATARACT, RIGHT EYE: ICD-10-CM

## 2024-12-10 DIAGNOSIS — H40.42X2 GLAUCOMA SECONDARY TO EYE INFLAMMATION, LEFT EYE, MODERATE STAGE: Primary | ICD-10-CM

## 2024-12-10 PROCEDURE — 92083 EXTENDED VISUAL FIELD XM: CPT | Performed by: OPHTHALMOLOGY

## 2024-12-10 PROCEDURE — 99213 OFFICE O/P EST LOW 20 MIN: CPT | Performed by: OPHTHALMOLOGY

## 2024-12-10 ASSESSMENT — INTRAOCULAR PRESSURE
OS: 15
OS: 16
OD: 17

## 2024-12-16 ENCOUNTER — OFFICE VISIT (OUTPATIENT)
Dept: URBAN - METROPOLITAN AREA CLINIC 71 | Facility: CLINIC | Age: 74
End: 2024-12-16

## 2024-12-16 DIAGNOSIS — H52.4 PRESBYOPIA: Primary | ICD-10-CM

## 2024-12-16 ASSESSMENT — INTRAOCULAR PRESSURE
OD: 17
OS: 10

## 2024-12-16 ASSESSMENT — VISUAL ACUITY
OD: 20/20
OS: 20/100

## 2025-05-14 ENCOUNTER — OFFICE VISIT (OUTPATIENT)
Dept: URBAN - METROPOLITAN AREA CLINIC 71 | Facility: CLINIC | Age: 75
End: 2025-05-14
Payer: MEDICARE

## 2025-05-14 DIAGNOSIS — H25.11 AGE-RELATED NUCLEAR CATARACT, RIGHT EYE: ICD-10-CM

## 2025-05-14 DIAGNOSIS — H40.42X2 GLAUCOMA SECONDARY TO EYE INFLAMMATION, LEFT EYE, MODERATE STAGE: Primary | ICD-10-CM

## 2025-05-14 DIAGNOSIS — Z96.1 PRESENCE OF INTRAOCULAR LENS: ICD-10-CM

## 2025-05-14 PROCEDURE — 92134 CPTRZ OPH DX IMG PST SGM RTA: CPT | Performed by: OPHTHALMOLOGY

## 2025-05-14 PROCEDURE — 92133 CPTRZD OPH DX IMG PST SGM ON: CPT | Performed by: OPHTHALMOLOGY

## 2025-05-14 PROCEDURE — 99213 OFFICE O/P EST LOW 20 MIN: CPT | Performed by: OPHTHALMOLOGY

## 2025-05-14 ASSESSMENT — INTRAOCULAR PRESSURE
OS: 20
OD: 17
OS: 17
OD: 18